# Patient Record
Sex: FEMALE | Race: WHITE | NOT HISPANIC OR LATINO | URBAN - METROPOLITAN AREA
[De-identification: names, ages, dates, MRNs, and addresses within clinical notes are randomized per-mention and may not be internally consistent; named-entity substitution may affect disease eponyms.]

---

## 2017-08-30 ENCOUNTER — EMERGENCY (EMERGENCY)
Facility: HOSPITAL | Age: 27
LOS: 1 days | End: 2017-08-30
Payer: OTHER MISCELLANEOUS

## 2017-08-30 PROCEDURE — 73630 X-RAY EXAM OF FOOT: CPT | Mod: 26,RT

## 2017-08-30 PROCEDURE — 99283 EMERGENCY DEPT VISIT LOW MDM: CPT

## 2017-09-01 ENCOUNTER — EMERGENCY (EMERGENCY)
Facility: HOSPITAL | Age: 27
LOS: 1 days | End: 2017-09-01
Payer: OTHER MISCELLANEOUS

## 2017-09-01 PROCEDURE — 93971 EXTREMITY STUDY: CPT | Mod: 26,RT

## 2017-09-01 PROCEDURE — 99284 EMERGENCY DEPT VISIT MOD MDM: CPT

## 2019-12-12 ENCOUNTER — TRANSCRIPTION ENCOUNTER (OUTPATIENT)
Age: 29
End: 2019-12-12

## 2020-06-05 PROBLEM — Z00.00 ENCOUNTER FOR PREVENTIVE HEALTH EXAMINATION: Status: ACTIVE | Noted: 2020-06-05

## 2020-06-08 ENCOUNTER — APPOINTMENT (OUTPATIENT)
Dept: MRI IMAGING | Facility: CLINIC | Age: 30
End: 2020-06-08
Payer: COMMERCIAL

## 2020-06-08 PROCEDURE — 72141 MRI NECK SPINE W/O DYE: CPT

## 2020-06-09 ENCOUNTER — OUTPATIENT (OUTPATIENT)
Dept: OUTPATIENT SERVICES | Facility: HOSPITAL | Age: 30
LOS: 1 days | End: 2020-06-09

## 2020-06-16 ENCOUNTER — TRANSCRIPTION ENCOUNTER (OUTPATIENT)
Age: 30
End: 2020-06-16

## 2020-06-16 ENCOUNTER — APPOINTMENT (OUTPATIENT)
Dept: NEUROSURGERY | Facility: CLINIC | Age: 30
End: 2020-06-16
Payer: COMMERCIAL

## 2020-06-16 VITALS
DIASTOLIC BLOOD PRESSURE: 87 MMHG | BODY MASS INDEX: 49.51 KG/M2 | WEIGHT: 290 LBS | HEART RATE: 96 BPM | HEIGHT: 64 IN | SYSTOLIC BLOOD PRESSURE: 147 MMHG

## 2020-06-16 DIAGNOSIS — M50.10 CERVICAL DISC DISORDER WITH RADICULOPATHY, UNSPECIFIED CERVICAL REGION: ICD-10-CM

## 2020-06-16 DIAGNOSIS — L73.2 HIDRADENITIS SUPPURATIVA: ICD-10-CM

## 2020-06-16 DIAGNOSIS — Z83.3 FAMILY HISTORY OF DIABETES MELLITUS: ICD-10-CM

## 2020-06-16 DIAGNOSIS — Z72.89 OTHER PROBLEMS RELATED TO LIFESTYLE: ICD-10-CM

## 2020-06-16 DIAGNOSIS — Z82.0 FAMILY HISTORY OF EPILEPSY AND OTHER DISEASES OF THE NERVOUS SYSTEM: ICD-10-CM

## 2020-06-16 DIAGNOSIS — G95.20 UNSPECIFIED CORD COMPRESSION: ICD-10-CM

## 2020-06-16 PROCEDURE — 99204 OFFICE O/P NEW MOD 45 MIN: CPT

## 2020-06-16 RX ORDER — ALPRAZOLAM 0.25 MG/1
0.25 TABLET ORAL
Refills: 0 | Status: ACTIVE | COMMUNITY

## 2020-06-16 RX ORDER — SERTRALINE HYDROCHLORIDE 100 MG/1
100 TABLET, FILM COATED ORAL
Refills: 0 | Status: ACTIVE | COMMUNITY

## 2020-06-16 NOTE — PLAN
[FreeTextEntry1] : \par DIAGNOSIS:  CERVICAL SPONDYLOSIS\par TREATMENT PLAN:  NON-SURGICAL   VS.   ACDF  C5-6\par \par This is a patient with cervical spondylosis and stenosis.  I have recommended nonsurgical management at this time.  This consists of physical therapy and/or manual medicine in conjunction to medical therapy and other conservative methods.  These include the consideration of trigger point injections and or the utilization of modalities such as TENS where applicable.  The next tier would be the referral to a pain management specialist (anesthesia or physiatry) for the consideration of spinal injections.  This includes the options of epidural steroids, facet injections as well as other novel techniques that may provide pain relief.  Although there is indeed evidence of disk degeneration on imaging, discectomy or spinal fusion for the sole purposes of treating neck pain in the absence of a compressive lesion or neurological issue is associated with poor outcomes.   \par \par If all  nonsurgical methods fail, and/or the patient develops neurologic issues then, I have recommended cervical decompression and fusion as a treatment option.  This entails removing the disk, osteophytes and the ventral uncovertebral joints along with the underlying hypertrophied/ossified posterior longitudinal ligamentum.  This will allow for a widening of the spinal canal and the neuroforamen at the effected levels.  In doing so this will likely result in added instability to the spine at this level requiring the need for instrumented stabilization and fusion.  This implies the use of anterior plate fixation and interbody prosthetics to provide strength and anatomical alignment to the operated segments.  \par \par Risks and benefits of surgery were described to the patient in detail which include but not excluding those otherwise not mentioned:  coma paralysis, death, stroke, spinal fluid leak, nerve injury, weakness, infection, hardware malfunction/failure/mal-positioning requiring re-operation, vascular injury, nonunion/pseudoarthrosis, adjacent segment degeneration, dysphonia/dysphagia and persistent pain.\par \par I have reviewed the images in detail with the patient today in my office and have answered all questions regarding this condition to the best of my ability to the patient’s satisfaction.

## 2020-06-16 NOTE — RESULTS/DATA
[FreeTextEntry1] : \par Her [place] shows a very large disc herniation with stenosis and spinal cord compression at the C5-6 level.

## 2020-06-16 NOTE — HISTORY OF PRESENT ILLNESS
[de-identified] : \nathan Burleson is a pleasant 30-year-old here for evaluation of her cervical spine. She states that she may have injured her neck when moving some heavy furniture and suctioned during a house move in the winter of 2019. She comes some simple treatment since then and since the tendon that hasn't done any active treatment. She did a video visit with her primary who prescribes her steroids and gabapentin however steroids didn't really help and gabapentin made her feel uncomfortable.  She has severe neck and left arm pain. She is right-hand dominant. She has no history to suggest trauma. She has no overt evidence of myelopathy.

## 2020-06-16 NOTE — REASON FOR VISIT
[New Patient Visit] : a new patient visit [FreeTextEntry1] : HERNIATED DISC- IMAGING DONE AT [facility].

## 2020-06-16 NOTE — PHYSICAL EXAM
[No Visual Abnormalities] : no visible abnormalities [Normal] : normal [Able to heel walk] : the patient was able to heel walk [Able to toe walk] : the patient was able to toe walk [Intact] : all motor groups within normal limits of strength and tone bilaterally

## 2020-11-19 ENCOUNTER — APPOINTMENT (OUTPATIENT)
Dept: OBGYN | Facility: CLINIC | Age: 30
End: 2020-11-19

## 2020-11-20 ENCOUNTER — APPOINTMENT (OUTPATIENT)
Dept: OBGYN | Facility: CLINIC | Age: 30
End: 2020-11-20
Payer: COMMERCIAL

## 2020-11-20 VITALS
HEIGHT: 64 IN | DIASTOLIC BLOOD PRESSURE: 60 MMHG | BODY MASS INDEX: 50.02 KG/M2 | WEIGHT: 293 LBS | SYSTOLIC BLOOD PRESSURE: 124 MMHG | TEMPERATURE: 98.4 F

## 2020-11-20 DIAGNOSIS — A63.0 ANOGENITAL (VENEREAL) WARTS: ICD-10-CM

## 2020-11-20 DIAGNOSIS — Z86.59 PERSONAL HISTORY OF OTHER MENTAL AND BEHAVIORAL DISORDERS: ICD-10-CM

## 2020-11-20 DIAGNOSIS — B97.7 PAPILLOMAVIRUS AS THE CAUSE OF DISEASES CLASSIFIED ELSEWHERE: ICD-10-CM

## 2020-11-20 PROCEDURE — 99203 OFFICE O/P NEW LOW 30 MIN: CPT

## 2020-11-20 RX ORDER — MUPIROCIN 20 MG/G
2 OINTMENT TOPICAL
Qty: 22 | Refills: 0 | Status: ACTIVE | COMMUNITY
Start: 2020-09-29

## 2020-11-20 RX ORDER — GABAPENTIN 300 MG/1
300 CAPSULE ORAL
Refills: 0 | Status: DISCONTINUED | COMMUNITY
End: 2020-11-20

## 2020-11-20 RX ORDER — ERGOCALCIFEROL 1.25 MG/1
1.25 MG CAPSULE, LIQUID FILLED ORAL
Qty: 4 | Refills: 0 | Status: ACTIVE | COMMUNITY
Start: 2020-08-24

## 2020-11-20 RX ORDER — CELECOXIB 200 MG/1
200 CAPSULE ORAL TWICE DAILY
Qty: 60 | Refills: 1 | Status: DISCONTINUED | COMMUNITY
Start: 2020-06-16 | End: 2020-11-20

## 2020-11-20 RX ORDER — UBIDECARENONE/VIT E ACET 100MG-5
CAPSULE ORAL
Refills: 0 | Status: ACTIVE | COMMUNITY

## 2020-11-20 RX ORDER — AMOXICILLIN 500 MG/1
500 CAPSULE ORAL
Qty: 28 | Refills: 0 | Status: DISCONTINUED | COMMUNITY
Start: 2020-06-01

## 2020-11-20 RX ORDER — FOLIC ACID 1 MG/1
1 TABLET ORAL
Qty: 30 | Refills: 0 | Status: DISCONTINUED | COMMUNITY
Start: 2020-08-26

## 2020-11-20 RX ORDER — PREGABALIN 75 MG/1
75 CAPSULE ORAL
Qty: 28 | Refills: 0 | Status: DISCONTINUED | COMMUNITY
Start: 2020-06-16 | End: 2020-11-20

## 2020-11-20 RX ORDER — DOXYCYCLINE 100 MG/1
100 CAPSULE ORAL
Qty: 14 | Refills: 0 | Status: ACTIVE | COMMUNITY
Start: 2020-10-05

## 2020-11-20 RX ORDER — FLUCONAZOLE 100 MG/1
100 TABLET ORAL
Qty: 5 | Refills: 0 | Status: DISCONTINUED | COMMUNITY
Start: 2020-06-01

## 2020-11-20 RX ORDER — TRAMADOL HYDROCHLORIDE 50 MG/1
50 TABLET, COATED ORAL
Qty: 30 | Refills: 0 | Status: DISCONTINUED | COMMUNITY
Start: 2020-06-16 | End: 2020-11-20

## 2020-11-20 RX ORDER — FOLIC ACID 20 MG
CAPSULE ORAL
Refills: 0 | Status: DISCONTINUED | COMMUNITY

## 2020-11-20 NOTE — PLAN
[FreeTextEntry1] : Pt to sign records request for records from previous provider, recent pap and other testing.\par Advised to not take xanax when trying to conceive unless necessary\par AMH, Estrogen, FSH, on day 3 of cycle, Testosterone on day 21 of cycle. Pt reports she had recent Thyroid testing with PCP or repeat at [facility].\par \par Pt to rto for MD santoro of condyloma, too large to treat in office.\par Continue folic acid, start prenatal vitamin. \par Continue zoloft as prescribed\par Pt understands and agrees\par

## 2020-11-20 NOTE — REASON FOR VISIT
[Initial] : an initial consultation for [FreeTextEntry2] : Pt reports actively trying to conceive. Pt also reports bumps on anus, present for years.

## 2020-11-20 NOTE — HISTORY OF PRESENT ILLNESS
[Yes] : Patient has concerns regarding sex [Currently Active] : currently active [Men] : men [No] : No [PapSmeardate] : 2020 [FreeTextEntry1] : 11/5/2020

## 2020-12-02 DIAGNOSIS — E22.1 HYPERPROLACTINEMIA: ICD-10-CM

## 2020-12-11 ENCOUNTER — APPOINTMENT (OUTPATIENT)
Dept: OBGYN | Facility: CLINIC | Age: 30
End: 2020-12-11
Payer: COMMERCIAL

## 2020-12-11 VITALS — TEMPERATURE: 97.5 F

## 2020-12-11 DIAGNOSIS — Z31.41 ENCOUNTER FOR FERTILITY TESTING: ICD-10-CM

## 2020-12-11 PROCEDURE — 99213 OFFICE O/P EST LOW 20 MIN: CPT

## 2020-12-11 PROCEDURE — 99072 ADDL SUPL MATRL&STAF TM PHE: CPT

## 2020-12-11 NOTE — PLAN
[FreeTextEntry1] : follow up gyn as needed   patient to consider HSG   if unremarkable, will consider [facility] referral \par encouraged to have partner evaluation by urologist and submit for semen analysis

## 2021-11-22 ENCOUNTER — APPOINTMENT (OUTPATIENT)
Dept: OBGYN | Facility: CLINIC | Age: 31
End: 2021-11-22

## 2023-01-09 ENCOUNTER — APPOINTMENT (OUTPATIENT)
Dept: OBGYN | Facility: CLINIC | Age: 33
End: 2023-01-09

## 2023-05-09 ENCOUNTER — APPOINTMENT (OUTPATIENT)
Dept: OPHTHALMOLOGY | Facility: CLINIC | Age: 33
End: 2023-05-09
Payer: COMMERCIAL

## 2023-05-09 ENCOUNTER — NON-APPOINTMENT (OUTPATIENT)
Age: 33
End: 2023-05-09

## 2023-05-09 PROCEDURE — 92004 COMPRE OPH EXAM NEW PT 1/>: CPT

## 2023-10-17 ENCOUNTER — NON-APPOINTMENT (OUTPATIENT)
Age: 33
End: 2023-10-17

## 2023-10-17 ENCOUNTER — APPOINTMENT (OUTPATIENT)
Dept: OBGYN | Facility: CLINIC | Age: 33
End: 2023-10-17
Payer: COMMERCIAL

## 2023-10-17 VITALS
HEIGHT: 64 IN | DIASTOLIC BLOOD PRESSURE: 80 MMHG | WEIGHT: 288 LBS | SYSTOLIC BLOOD PRESSURE: 120 MMHG | BODY MASS INDEX: 49.17 KG/M2

## 2023-10-17 DIAGNOSIS — Z32.01 ENCOUNTER FOR PREGNANCY TEST, RESULT POSITIVE: ICD-10-CM

## 2023-10-17 LAB
HCG UR QL: POSITIVE
QUALITY CONTROL: YES

## 2023-10-17 PROCEDURE — 81025 URINE PREGNANCY TEST: CPT

## 2023-10-17 PROCEDURE — 99212 OFFICE O/P EST SF 10 MIN: CPT

## 2023-10-18 ENCOUNTER — NON-APPOINTMENT (OUTPATIENT)
Age: 33
End: 2023-10-18

## 2023-10-18 LAB
C TRACH RRNA SPEC QL NAA+PROBE: NOT DETECTED
HCG SERPL-MCNC: 63 MIU/ML
N GONORRHOEA RRNA SPEC QL NAA+PROBE: NOT DETECTED
PROGEST SERPL-MCNC: 11.9 NG/ML
SOURCE AMPLIFICATION: NORMAL

## 2023-10-20 ENCOUNTER — NON-APPOINTMENT (OUTPATIENT)
Age: 33
End: 2023-10-20

## 2023-10-23 ENCOUNTER — NON-APPOINTMENT (OUTPATIENT)
Age: 33
End: 2023-10-23

## 2023-10-26 ENCOUNTER — APPOINTMENT (OUTPATIENT)
Dept: ENDOCRINOLOGY | Facility: CLINIC | Age: 33
End: 2023-10-26

## 2023-10-30 ENCOUNTER — APPOINTMENT (OUTPATIENT)
Dept: ANTEPARTUM | Facility: CLINIC | Age: 33
End: 2023-10-30

## 2023-10-30 ENCOUNTER — APPOINTMENT (OUTPATIENT)
Dept: OBGYN | Facility: CLINIC | Age: 33
End: 2023-10-30

## 2024-07-30 ENCOUNTER — APPOINTMENT (OUTPATIENT)
Dept: PODIATRY | Facility: CLINIC | Age: 34
End: 2024-07-30

## 2024-07-30 DIAGNOSIS — Q66.6 OTHER CONGENITAL VALGUS DEFORMITIES OF FEET: ICD-10-CM

## 2024-07-30 DIAGNOSIS — M72.2 PLANTAR FASCIAL FIBROMATOSIS: ICD-10-CM

## 2024-07-30 PROCEDURE — 29540 STRAPPING ANKLE &/FOOT: CPT | Mod: RT

## 2024-07-30 PROCEDURE — 99204 OFFICE O/P NEW MOD 45 MIN: CPT | Mod: 25

## 2024-07-30 PROCEDURE — 73620 X-RAY EXAM OF FOOT: CPT | Mod: RT

## 2024-07-30 RX ORDER — MELOXICAM 15 MG/1
15 TABLET ORAL DAILY
Qty: 30 | Refills: 0 | Status: ACTIVE | COMMUNITY
Start: 2024-07-30 | End: 2024-08-29

## 2024-08-01 PROBLEM — Q66.6 VALGUS DEFORMITIES OF FEET, CONGENITAL: Status: ACTIVE | Noted: 2024-08-01

## 2024-08-01 NOTE — PHYSICAL EXAM
[TextEntry] : Dorsalis pedis and posterior tibial pulses were palpable and equal bilat.  The capillary return was instant bilat.  The patient exhibited a flatfooted appearance upon weight bearing with collapsed medial arches and lateral bowing of the Achilles tendons. A biomechanical exam was performed on the patient.  The patient was seated in the chair.  Evaluation of the joints of the foot in passive range of motion was performed.  The sub talar joint was evaluated in both neutral and relaxed position.  The sub talar joint is noted to be in a pronated position.  There is also a mild equinus deformity noted.  Upon examination of the forefoot there is a slight forefoot varus present in compensation for the valgus position of the heel and a pronated sub talar joint.  Decreased ankle dorsiflexion was noted bilateral, the affected foot had less dorsiflexion. First metatarsal phalangeal joint dorsiflexion was limited. Resting calcaneal stance position was 5 degrees everted bilateral.  	Gait exam was also performed, which reveals an eversion of the heel bone and mid foot collapse with excessive pronation.  The forefoot is in a compensated varus position during gait as well.  Evaluation of shoe gear reveals excessive lateral heel wear bilateral with considerable breakdown of the heel counter and excessive flexibility of the soles.  There is pain upon palpation with swelling at the base of the right plantar medial heel.  Ecchymosis is absent.  The area is warm to the touch.

## 2024-08-01 NOTE — ASSESSMENT
[FreeTextEntry1] : Assessment: Plantar fasciitis with heel spur syndrome, right foot. Calcaneal valgus deformities, bilaterally.  Plan:   DP and lateral weightbearing radiographs were taken of the right foot and reveal no fractures or dislocations.  Plantar and posterior heel spurs are noted on the lateral view.  The patient was instructed about the potential benefits of physical therapy as a conservative means of treating this condition, either independently, or as an adjunct to other conservative treatment regimens such as, injection therapy, strapping, oral anti-inflammatory medications and the usage of orthotics.  The types of physical therapy advocated for the treatment of this diagnosis includes: whirlpool and ultrasound.  The patient was instructed that physical therapy is considered therapeutic when performed 2-3 times per week. The patient was given stretching exercises to be performed for the Achilles tendon and plantar fascial regions of both feet.  I instructed the patient to perform the exercises gradually at first and then more aggressively twice per day, for 2 minutes per exercise.  The patient was given written instructions for reference. The patient was advised to limit weight-bearing activities for the next 2 weeks.  After further assessment the right foot was placed in approximately 90 degrees to the ankle with the foot in as close to neutral position as possible.  The foot and ankle were prepped to be clean and dry.  Pre tape spray may have been used on the foot and ankle as indicated.  Pre-tape adhesive spray provides an extra sticky, waterproof barrier, that helps tapes and strapping stick better to the skin. Perfect for oily skin, sweaty skin, water sports and extreme environments. It can be a game-changer when strapping challenging areas such as sweaty or wet ankles & feet. I then applied a strapping to the right foot and ankle for support and compression to help relieve pressure and symptoms related to the patient's foot/ankle problem.   The strapping can aid recovery by supporting the muscles, tendons, or ligaments around the affected painful area, as well as improving blood flow to the area. Plus, the strapping will reduce pain and improve the gait, preventing further injury from poor alignment. The strapping also provides proprioceptive feedback. The strapping can reduce the amount of stretching and moving the ligaments do when the patient is weight bearing. This not only gives tissues a better chance to heal, but it also helps prevent further damage. This strapping also minimizes foot pronation, collapse, or flattening which causes over use and irritation to the muscle, tendons, and ligaments in the foot. Often, relief of pain with the strapping is a diagnostic indication for the need of functional orthotic devices. In general, strappings may be used to treat strains, sprains, dislocations, and some fractures. Strapping the foot and ankle provides the external stabilization that stretched ligaments (tissues connecting bone to bone) need while they heal. Most studies show that strapping the foot and ankle decreases the incidence and severity of the affected area.  Besides physical support, strapping can increase biofeedback and increase proprioception, which is a body's ability to know where it is thus aiding in healing and reducing further exacerbation of the pain. The patient was instructed to leave the strapping in place for the next few days to a week, if it was comfortable.  The patient was advised to keep the strapping dry, but leave it on and dry with a hair dryer if it got wet.   The patient was further instructed that if the strapping was uncomfortable or causes any problems it should be removed right away and the office notified.  The patient was given a prescription for Meloxicam 15-mg, to treat the inflammation and reduce pain & swelling and help restore normal function. The patient was instructed to take 1 tablet per day after her major meal for 2 weeks, so that it would be less offensive on the stomach.  The patient denied being allergic to the medication and was advised to call the office if they experienced any side effects.  I offered Julieta cortisone injection, which she declined at this time.  The patient was instructed as to the potential biomechanical and protective benefits that orthotic therapy could provide.  The patient was educated in the process of how orthotic devices would promote improved foot function and shock absorption.  A thorough explanation was given to the patient in reference to the benefits orthotic devices may provide in not only relieving the current symptoms, but in also preventing possible recurrence and/or secondary biomechanically related problems.  I encouraged her to wear her over-the-counter soles in the interim and switch to sneakers.  PTR:  2 weeks.

## 2024-08-01 NOTE — HISTORY OF PRESENT ILLNESS
[FreeTextEntry1] : Sarah Beth is a 34-year-old female patient who presents today for initial visit complaining of pain in her right heel.  She states that it has been worsening over the past three weeks.  She states about a year ago she saw another podiatrist with the same problem.  At that time, she stated the doctor gave her pain medication and showed her a way to stretch with a heating pad but did not offer her other solutions.  She stated that she did not go back, but ultimately, it did go away for a while.  She states that it came back and is bothering her.  She states that it hurts.  It is excruciating in the morning and that now it is bothering her all the time.  She states that she has tried over-the-counter insoles and over-the-counter Advil.  She denies any additional treatments.  She did go to urgent care two days ago because of the pain.  They took x-rays, told her she had a heel spur.

## 2024-12-25 PROBLEM — F10.90 ALCOHOL USE: Status: ACTIVE | Noted: 2020-06-16

## 2025-04-17 ENCOUNTER — NON-APPOINTMENT (OUTPATIENT)
Age: 35
End: 2025-04-17

## 2025-04-17 ENCOUNTER — APPOINTMENT (OUTPATIENT)
Dept: OPHTHALMOLOGY | Facility: CLINIC | Age: 35
End: 2025-04-17
Payer: COMMERCIAL

## 2025-04-17 PROCEDURE — 92014 COMPRE OPH EXAM EST PT 1/>: CPT
